# Patient Record
Sex: MALE | HISPANIC OR LATINO
[De-identification: names, ages, dates, MRNs, and addresses within clinical notes are randomized per-mention and may not be internally consistent; named-entity substitution may affect disease eponyms.]

---

## 2021-01-25 PROBLEM — Z00.00 ENCOUNTER FOR PREVENTIVE HEALTH EXAMINATION: Status: ACTIVE | Noted: 2021-01-25

## 2021-02-02 ENCOUNTER — APPOINTMENT (OUTPATIENT)
Dept: PULMONOLOGY | Facility: CLINIC | Age: 31
End: 2021-02-02
Payer: MEDICAID

## 2021-02-02 VITALS
OXYGEN SATURATION: 98 % | BODY MASS INDEX: 27.66 KG/M2 | HEART RATE: 69 BPM | DIASTOLIC BLOOD PRESSURE: 75 MMHG | HEIGHT: 64 IN | SYSTOLIC BLOOD PRESSURE: 119 MMHG | WEIGHT: 162 LBS | TEMPERATURE: 98.3 F

## 2021-02-02 PROCEDURE — 99072 ADDL SUPL MATRL&STAF TM PHE: CPT

## 2021-02-02 PROCEDURE — 99204 OFFICE O/P NEW MOD 45 MIN: CPT

## 2021-02-02 NOTE — ASSESSMENT
[FreeTextEntry1] : 29 yo male, no known previous med history with atypical right flank pain for 1 1/2 years\par Negative CXR on 12/3/20\par Does not appear acutely or chronically ill\par Will require review of imaging from hospital and records from Dr Sheets for further evaluation\par RTC in 1 month with information

## 2021-02-02 NOTE — CONSULT LETTER
[Dear  ___] : Dear  [unfilled], [FreeTextEntry1] : I had the pleasure of evaluating your patient, TRISTA MARTINEZ , in the office today.  Please review my consultation and evaluation report that follows below.  Please do not hesitate to call me if further information is necessary or if you wish to discuss ongoing care or diagnostic work-up.   \par I very much appreciate your referral and it is a privilege to be able to provide care for your patient.\par \par Sincerely,\par  \par Lux Cui MD, MHCM, FACP\par Pulmonary Medicine\par  of Medicine\par [employer] at [facility]\par \par [email or web address]\par [facility] [place]\par \par

## 2021-02-02 NOTE — HISTORY OF PRESENT ILLNESS
[TextBox_4] : 31 yo male referred by Ricarda Sheets NP from [facility]\par Atypical chest pain first in Nov 2019--[place] [facility]\par Then again in November 2020\par Back to NP\par Right sided chest pian and ??? mass\par CXR at [facility] 12/3/2020 was completely normal\par No records available\par Patient a very poor historian even with interpretation\par \par [place] , her for 4 years\par Lives with sisters\par Works in construction\par Nonsmoker, no drugs\par No meds except for pain ???\par No allergies\par No post FH

## 2021-03-02 ENCOUNTER — APPOINTMENT (OUTPATIENT)
Dept: PULMONOLOGY | Facility: CLINIC | Age: 31
End: 2021-03-02
Payer: MEDICAID

## 2021-03-02 VITALS
HEART RATE: 68 BPM | OXYGEN SATURATION: 100 % | BODY MASS INDEX: 27.66 KG/M2 | HEIGHT: 64 IN | SYSTOLIC BLOOD PRESSURE: 124 MMHG | TEMPERATURE: 98.6 F | DIASTOLIC BLOOD PRESSURE: 76 MMHG | WEIGHT: 162 LBS

## 2021-03-02 DIAGNOSIS — R07.89 OTHER CHEST PAIN: ICD-10-CM

## 2021-03-02 PROCEDURE — 99072 ADDL SUPL MATRL&STAF TM PHE: CPT

## 2021-03-02 PROCEDURE — 99214 OFFICE O/P EST MOD 30 MIN: CPT

## 2021-03-02 NOTE — HISTORY OF PRESENT ILLNESS
[TextBox_4] : 29 yo male comes back . We were not able to obtain any information\par Atypical right chest pain since 2019--sent from [facility] to evaluate ? mass\par Seen at [facility] and sent for CXR 12/20 which was completely normal\par \par still complains of pain in right flank worse with exercise or work but it is variable\par Remains a poor historian even with \par Apparently still working without difficulty\par No constitutional symp\par Notes right infracostal pain\par \par No GI symptoms, no dysphagia, no weight loss,

## 2021-03-02 NOTE — PHYSICAL EXAM
[No Acute Distress] : no acute distress [Normal Oropharynx] : normal oropharynx [Normal Appearance] : normal appearance [No Neck Mass] : no neck mass [Normal Rate/Rhythm] : normal rate/rhythm [Normal S1, S2] : normal s1, s2 [No Murmurs] : no murmurs [No Resp Distress] : no resp distress [Clear to Auscultation Bilaterally] : clear to auscultation bilaterally [No Abnormalities] : no abnormalities [Benign] : benign [Normal Gait] : normal gait [No Clubbing] : no clubbing [No Cyanosis] : no cyanosis [No Edema] : no edema [FROM] : FROM [Normal Color/ Pigmentation] : normal color/ pigmentation [No Focal Deficits] : no focal deficits [Oriented x3] : oriented x3 [Normal Affect] : normal affect [TextBox_80] : No pain on palpation of right inferior ribs [TextBox_89] : Some discomfort on deep palpation of liver

## 2021-03-02 NOTE — ASSESSMENT
[FreeTextEntry1] : 31 yo man without apparent medical history, no meds no hx trauma complains of right sided upper abdominal discomfort over many months, perhaps over one year+\par CXR in December was reviewed and normal \par He does not appear to be ill\par Certainly , I do not identify any pulmonary dysfunction\par \par There is no definite stigmata of hepatic disease or mass lesion on palpation of abdomen\par \par I have recommended f/u at [facility] for further medical evaluation as required

## 2021-03-02 NOTE — CONSULT LETTER
[Dear  ___] : Dear  [unfilled], [FreeTextEntry1] : I had the pleasure of evaluating your patient, TRISTA MARTINEZ , in the office today.  Please review my consultation and evaluation report that follows below.  Please do not hesitate to call me if further information is necessary or if you wish to discuss ongoing care or diagnostic work-up.   \par I very much appreciate your referral and it is a privilege to be able to provide care for your patient.\par \par Sincerely,\par  \par Lux Cui MD, MHCM, FACP\par Pulmonary Medicine\par  of Medicine\par [employer] at [facility]\par \par [email or web address]\par [facility] [place]\par \par